# Patient Record
Sex: FEMALE | Race: WHITE | NOT HISPANIC OR LATINO | Employment: FULL TIME | URBAN - METROPOLITAN AREA
[De-identification: names, ages, dates, MRNs, and addresses within clinical notes are randomized per-mention and may not be internally consistent; named-entity substitution may affect disease eponyms.]

---

## 2021-05-30 ENCOUNTER — RECORDS - HEALTHEAST (OUTPATIENT)
Dept: ADMINISTRATIVE | Facility: CLINIC | Age: 51
End: 2021-05-30

## 2021-06-01 ENCOUNTER — RECORDS - HEALTHEAST (OUTPATIENT)
Dept: ADMINISTRATIVE | Facility: CLINIC | Age: 51
End: 2021-06-01

## 2023-04-27 ENCOUNTER — APPOINTMENT (OUTPATIENT)
Dept: RADIOLOGY | Facility: HOSPITAL | Age: 53
End: 2023-04-27
Attending: EMERGENCY MEDICINE
Payer: COMMERCIAL

## 2023-04-27 ENCOUNTER — HOSPITAL ENCOUNTER (EMERGENCY)
Facility: HOSPITAL | Age: 53
Discharge: HOME | End: 2023-04-27
Attending: EMERGENCY MEDICINE | Admitting: EMERGENCY MEDICINE
Payer: COMMERCIAL

## 2023-04-27 VITALS
TEMPERATURE: 97.7 F | OXYGEN SATURATION: 97 % | SYSTOLIC BLOOD PRESSURE: 131 MMHG | HEART RATE: 69 BPM | DIASTOLIC BLOOD PRESSURE: 80 MMHG | RESPIRATION RATE: 18 BRPM | BODY MASS INDEX: 32.72 KG/M2 | WEIGHT: 167.55 LBS

## 2023-04-27 DIAGNOSIS — R07.9 CHEST PAIN, UNSPECIFIED TYPE: ICD-10-CM

## 2023-04-27 LAB
ANION GAP SERPL CALCULATED.3IONS-SCNC: 10 MMOL/L (ref 7–15)
BASOPHILS # BLD AUTO: 0.1 10E3/UL (ref 0–0.2)
BASOPHILS NFR BLD AUTO: 1 %
BUN SERPL-MCNC: 11.9 MG/DL (ref 6–20)
CALCIUM SERPL-MCNC: 9.3 MG/DL (ref 8.6–10)
CHLORIDE SERPL-SCNC: 103 MMOL/L (ref 98–107)
CREAT SERPL-MCNC: 0.73 MG/DL (ref 0.51–0.95)
DEPRECATED HCO3 PLAS-SCNC: 26 MMOL/L (ref 22–29)
EOSINOPHIL # BLD AUTO: 0.2 10E3/UL (ref 0–0.7)
EOSINOPHIL NFR BLD AUTO: 2 %
ERYTHROCYTE [DISTWIDTH] IN BLOOD BY AUTOMATED COUNT: 13.2 % (ref 10–15)
GFR SERPL CREATININE-BSD FRML MDRD: >90 ML/MIN/1.73M2
GLUCOSE SERPL-MCNC: 103 MG/DL (ref 70–99)
HCT VFR BLD AUTO: 43.8 % (ref 35–47)
HGB BLD-MCNC: 14.4 G/DL (ref 11.7–15.7)
HOLD SPECIMEN: NORMAL
IMM GRANULOCYTES # BLD: 0 10E3/UL
IMM GRANULOCYTES NFR BLD: 0 %
LYMPHOCYTES # BLD AUTO: 1.4 10E3/UL (ref 0.8–5.3)
LYMPHOCYTES NFR BLD AUTO: 20 %
MCH RBC QN AUTO: 31.1 PG (ref 26.5–33)
MCHC RBC AUTO-ENTMCNC: 32.9 G/DL (ref 31.5–36.5)
MCV RBC AUTO: 95 FL (ref 78–100)
MONOCYTES # BLD AUTO: 0.5 10E3/UL (ref 0–1.3)
MONOCYTES NFR BLD AUTO: 7 %
NEUTROPHILS # BLD AUTO: 4.9 10E3/UL (ref 1.6–8.3)
NEUTROPHILS NFR BLD AUTO: 70 %
NRBC # BLD AUTO: 0 10E3/UL
NRBC BLD AUTO-RTO: 0 /100
PLATELET # BLD AUTO: 346 10E3/UL (ref 150–450)
POTASSIUM SERPL-SCNC: 3.8 MMOL/L (ref 3.4–5.3)
RBC # BLD AUTO: 4.63 10E6/UL (ref 3.8–5.2)
SODIUM SERPL-SCNC: 139 MMOL/L (ref 136–145)
TROPONIN T SERPL HS-MCNC: <6 NG/L
TROPONIN T SERPL HS-MCNC: <6 NG/L
WBC # BLD AUTO: 7 10E3/UL (ref 4–11)

## 2023-04-27 PROCEDURE — 80048 BASIC METABOLIC PNL TOTAL CA: CPT | Performed by: EMERGENCY MEDICINE

## 2023-04-27 PROCEDURE — 93005 ELECTROCARDIOGRAM TRACING: CPT | Performed by: STUDENT IN AN ORGANIZED HEALTH CARE EDUCATION/TRAINING PROGRAM

## 2023-04-27 PROCEDURE — 36415 COLL VENOUS BLD VENIPUNCTURE: CPT | Performed by: STUDENT IN AN ORGANIZED HEALTH CARE EDUCATION/TRAINING PROGRAM

## 2023-04-27 PROCEDURE — 36415 COLL VENOUS BLD VENIPUNCTURE: CPT | Performed by: EMERGENCY MEDICINE

## 2023-04-27 PROCEDURE — 84484 ASSAY OF TROPONIN QUANT: CPT | Mod: 91 | Performed by: EMERGENCY MEDICINE

## 2023-04-27 PROCEDURE — 250N000013 HC RX MED GY IP 250 OP 250 PS 637: Performed by: EMERGENCY MEDICINE

## 2023-04-27 PROCEDURE — 99285 EMERGENCY DEPT VISIT HI MDM: CPT | Mod: 25

## 2023-04-27 PROCEDURE — 84484 ASSAY OF TROPONIN QUANT: CPT | Performed by: EMERGENCY MEDICINE

## 2023-04-27 PROCEDURE — 71046 X-RAY EXAM CHEST 2 VIEWS: CPT

## 2023-04-27 PROCEDURE — 85025 COMPLETE CBC W/AUTO DIFF WBC: CPT | Performed by: EMERGENCY MEDICINE

## 2023-04-27 PROCEDURE — 93005 ELECTROCARDIOGRAM TRACING: CPT | Mod: 76 | Performed by: EMERGENCY MEDICINE

## 2023-04-27 RX ORDER — ASPIRIN 81 MG/1
243 TABLET, CHEWABLE ORAL ONCE
Status: COMPLETED | OUTPATIENT
Start: 2023-04-27 | End: 2023-04-27

## 2023-04-27 RX ORDER — NITROGLYCERIN 0.4 MG/1
0.4 TABLET SUBLINGUAL ONCE
Status: COMPLETED | OUTPATIENT
Start: 2023-04-27 | End: 2023-04-27

## 2023-04-27 RX ADMIN — NITROGLYCERIN 0.4 MG: 0.4 TABLET, ORALLY DISINTEGRATING SUBLINGUAL at 08:33

## 2023-04-27 RX ADMIN — ASPIRIN 243 MG: 81 TABLET, CHEWABLE ORAL at 08:33

## 2023-04-27 ASSESSMENT — ACTIVITIES OF DAILY LIVING (ADL)
ADLS_ACUITY_SCORE: 35
ADLS_ACUITY_SCORE: 35

## 2023-04-27 ASSESSMENT — ENCOUNTER SYMPTOMS
RHINORRHEA: 0
FEVER: 0
DIZZINESS: 0
COUGH: 0
NAUSEA: 0
NECK PAIN: 1
LIGHT-HEADEDNESS: 0

## 2023-04-27 NOTE — ED NOTES
[facility]  Expected date:   Expected time:   Means of arrival:   Comments:

## 2023-04-27 NOTE — DISCHARGE INSTRUCTIONS
Follow-up rapid access heart clinic.  Return to the ER for worsening symptoms such as return of chest pain.  Your EKG and blood tests and x-ray are reassuring but we cannot exclude heart disease therefore you need to follow-up with he rapid access heart clinic

## 2023-04-27 NOTE — ED NOTES
Pt states she no longer has cp.It hasn't been back since she took the nitroglycerin. She is feeling at baseline without pain.

## 2023-04-27 NOTE — ED PROVIDER NOTES
EMERGENCY DEPARTMENT ENCOUNTER      NAME: Izabel Bowling  AGE: 52 year old female  YOB: 1970  MRN: [id number]  EVALUATION DATE & TIME: 4/27/2023  8:01 AM    PCP: No primary care provider on file.    ED PROVIDER: Jose Elaine MD        Chief Complaint   Patient presents with     Chest Pain         FINAL IMPRESSION:  1. Chest pain, unspecified type          ED COURSE & MEDICAL DECISION MAKING:   Pertinent Labs & Imaging studies reviewed. (See chart for details)  52 year old female presents to the Emergency Department for evaluation of chest pain      ED Course as of 04/27/23 1421   Thu Apr 27, 2023   0859 52-year-old female who presents with left-sided chest tightness and left arm pain that occurred at work.  Does smoke.  Has a family history of heart disease.  Has not seen a doctor in probably a decade   0859 Differential includes ACS, pulmonary emboli, pneumonia, aortic dissection, esophageal rupture, pneumothorax, pneumonia, malignancy, pleurisy, shingles, and GERD.  Based on history and examination pulmonary emboli and aortic dissection unlikely.      0900 Will obtain EKG, troponin, BMP, CBC, give aspirin and nitroglycerin   0900 History and examination pulmonary emboli unlikely   0900 X-ray ordered   0900 Troponin T, High Sensitivity: <6   0900 Delta troponin ordered   0900 WBC: 7.0   0900 Hemoglobin: 14.4   0900 Platelet Count: 346   0900 Glucose(!): 103  Nonfasting glucose   1217 Heart score 3.   1420 Pain has resolved   1420 Notified by nursing the patient is getting antsy and she is considering leaving AGAINST MEDICAL ADVICE   1421 Updated patient on results of 2 EKGs and 2 troponins and labs and x-ray   1421 Refer to rapid access heart clinic     Discussed admission for observation expedited cardiac work-up patient wants to complete work-up as an outpatient.  Symptoms have resolved.  Patient will return to the ER for worsening symptoms.  Patient knows he cannot rule out heart  "disease.    8:18 AM I met with patient for initial interview and encounter. We also discussed plan for treatment and diagnostic interventions.   12:12 PM Updated patient on diagnostic findings. We also discussed plan for observation/admission, but she declined and will follow-up with [facility] heart clinic.     Medical Decision Making    History:    Supplemental history from: N/A    External Record(s) reviewed: Documented in chart, if applicable.    Work Up:    Chart documentation includes differential considered and any EKGs or imaging independently interpreted by provider, where specified.    In additional to work up documented, I considered the following work up: Documented in chart, if applicable.    External consultation:    Discussion of management with another provider: Documented in chart, if applicable    Complicating factors:    Care impacted by chronic illness: N/A    Care affected by social determinants of health: N/A    Disposition considerations: Discharge. No recommendations on prescription strength medication(s). I recommended admission, but the patient declined.    At the conclusion of the encounter I discussed the results of all of the tests and the disposition. The questions were answered. The patient or family acknowledged understanding and was agreeable with the care plan.       MEDICATIONS GIVEN IN THE EMERGENCY:  Medications   nitroGLYcerin (NITROSTAT) sublingual tablet 0.4 mg (0.4 mg Sublingual $Given 4/27/23 0833)   aspirin (ASA) chewable tablet 243 mg (243 mg Oral $Given 4/27/23 0833)       NEW PRESCRIPTIONS STARTED AT TODAY'S ER VISIT  There are no discharge medications for this patient.      =================================================================    HPI    Triage note  \"She started to have chest pain two hours ago. Denies any heart issues. She took one 81mg ASA prior to arrival. \"    Patient information was obtained from: Patient    Use of : N/A    Izabel Bowling is a " "52 year old female with no known pertinent history who presents to this ED by private car for evaluation of chest pain.     Patient reports a sudden onset of chest \"tightness\" that began while at work around 6:15 AM. She notes that the pain radiated into her left arm which prompted her visit into the ED. The pain at onset was rated at 5/10; however, at present, the pain has improved to a 3/10 after taking 1x 81 mg ASA. In addition, she complains of neck pain but is unsure if it is unchanged a she has chronic neck pain.     She otherwise denies any nausea, dizziness, lightheadedness, cough, runny nose, or fever.     Of note, patient has not seen a PCP in >10 years, but denies any known cardiac problems or history of similar symptoms. She is a smoker.     REVIEW OF SYSTEMS   Review of Systems   Constitutional: Negative for fever.   HENT: Negative for rhinorrhea.    Respiratory: Negative for cough.    Cardiovascular: Positive for chest pain (radiated to left arm, \"tightness,\" 5/10 onset, 3/10 now).   Gastrointestinal: Negative for nausea.   Musculoskeletal: Positive for neck pain (chronic).   Neurological: Negative for dizziness and light-headedness.        PAST MEDICAL HISTORY:  History reviewed. No pertinent past medical history.    PAST SURGICAL HISTORY:  History reviewed. No pertinent surgical history.    CURRENT MEDICATIONS:    No current outpatient medications on file.    ALLERGIES:  No Known Allergies    FAMILY HISTORY:  No family history on file.    SOCIAL HISTORY:   Social History     Socioeconomic History     Marital status:        VITALS:  /80   Pulse 69   Temp 97.7  F (36.5  C) (Temporal)   Resp 18   Wt 76 kg (167 lb 8.8 oz)   SpO2 97%   BMI 32.72 kg/m      PHYSICAL EXAM    Vitals: /80   Pulse 69   Temp 97.7  F (36.5  C) (Temporal)   Resp 18   Wt 76 kg (167 lb 8.8 oz)   SpO2 97%   BMI 32.72 kg/m    General: Appears in no acute distress, awake, alert, interactive.  Eyes: " Conjunctivae non-injected. Sclera anicteric.  HENT: Atraumatic.  Neck: Supple.  Respiratory/Chest: Respiration unlabored.  Heart: RRR, 2+ radial pulses.   Abdomen: non distended  Musculoskeletal: Normal extremities. No edema or erythema.  Skin: Normal color. No rash or diaphoresis.  Neurologic: Face symmetric, moves all extremities spontaneously. Speech clear.  Psychiatric: Oriented to person, place, and time. Affect appropriate.       LAB:  All pertinent labs reviewed and interpreted.  Results for orders placed or performed during the hospital encounter of 04/27/23   XR Chest 2 Views    Impression    IMPRESSION: Lungs are clear. No hydropneumothorax or rib fractures. Heart and pulmonary vascularity are normal.   Extra Blue Top Tube   Result Value Ref Range    Hold Specimen JIC    Extra Red Top Tube   Result Value Ref Range    Hold Specimen JIC    Extra Green Top (Lithium Heparin) Tube   Result Value Ref Range    Hold Specimen JIC    Extra Purple Top Tube   Result Value Ref Range    Hold Specimen JIC    Basic metabolic panel   Result Value Ref Range    Sodium 139 136 - 145 mmol/L    Potassium 3.8 3.4 - 5.3 mmol/L    Chloride 103 98 - 107 mmol/L    Carbon Dioxide (CO2) 26 22 - 29 mmol/L    Anion Gap 10 7 - 15 mmol/L    Urea Nitrogen 11.9 6.0 - 20.0 mg/dL    Creatinine 0.73 0.51 - 0.95 mg/dL    Calcium 9.3 8.6 - 10.0 mg/dL    Glucose 103 (H) 70 - 99 mg/dL    GFR Estimate >90 >60 mL/min/1.73m2   Result Value Ref Range    Troponin T, High Sensitivity <6 <=14 ng/L   CBC with platelets and differential   Result Value Ref Range    WBC Count 7.0 4.0 - 11.0 10e3/uL    RBC Count 4.63 3.80 - 5.20 10e6/uL    Hemoglobin 14.4 11.7 - 15.7 g/dL    Hematocrit 43.8 35.0 - 47.0 %    MCV 95 78 - 100 fL    MCH 31.1 26.5 - 33.0 pg    MCHC 32.9 31.5 - 36.5 g/dL    RDW 13.2 10.0 - 15.0 %    Platelet Count 346 150 - 450 10e3/uL    % Neutrophils 70 %    % Lymphocytes 20 %    % Monocytes 7 %    % Eosinophils 2 %    % Basophils 1 %    %  Immature Granulocytes 0 %    NRBCs per 100 WBC 0 <1 /100    Absolute Neutrophils 4.9 1.6 - 8.3 10e3/uL    Absolute Lymphocytes 1.4 0.8 - 5.3 10e3/uL    Absolute Monocytes 0.5 0.0 - 1.3 10e3/uL    Absolute Eosinophils 0.2 0.0 - 0.7 10e3/uL    Absolute Basophils 0.1 0.0 - 0.2 10e3/uL    Absolute Immature Granulocytes 0.0 <=0.4 10e3/uL    Absolute NRBCs 0.0 10e3/uL   Result Value Ref Range    Troponin T, High Sensitivity <6 <=14 ng/L       RADIOLOGY:  Reviewed all pertinent imaging. Please see official radiology report.  XR Chest 2 Views   Final Result   IMPRESSION: Lungs are clear. No hydropneumothorax or rib fractures. Heart and pulmonary vascularity are normal.          EKG:    Performed at: 08:04    Impression: Normal sinus rhythm. Normal ECG.    Rate: 91 bpm  Rhythm: NSR  Axis: 35  AZ Interval: 134 ms  QRS Interval: 72 ms  QTc Interval: 425 ms  ST Changes: None  Comparison: No previous ECG's available for comparison.     Performed at: 10:18    Impression: Normal sinus rhythm. Normal ECG.    Rate: 72 bpm  Rhythm: NSR  Axis: 47  AZ Interval: 142 ms  QRS Interval: 72 ms  QTc Interval: 413 ms  ST Changes: None  Comparison: When compared to ECG completed from earlier today, there were no significant changes noted.     I have independently reviewed and interpreted the EKG(s) documented above.      I, Toi Kevin, am serving as a scribe to document services personally performed by Brian Elaine MD based on my observation and the provider's statements to me. I, Dr. Brian Elaine, attest that Toi Kevin is acting in a scribe capacity, has observed my performance of the services and has documented them in accordance with my direction.    Brian Elaine MD  Emergency Medicine  [facility] EMERGENCY DEPARTMENT  [address] [facility] [id number] [phone]     Brian Elaine MD  04/27/23 6768

## 2023-04-27 NOTE — ED TRIAGE NOTES
She started to have chest pain two hours ago. Denies any heart issues. She took one 81mg ASA prior to arrival.

## 2023-04-28 LAB
ATRIAL RATE - MUSE: 72 BPM
ATRIAL RATE - MUSE: 91 BPM
DIASTOLIC BLOOD PRESSURE - MUSE: NORMAL MMHG
DIASTOLIC BLOOD PRESSURE - MUSE: NORMAL MMHG
INTERPRETATION ECG - MUSE: NORMAL
INTERPRETATION ECG - MUSE: NORMAL
P AXIS - MUSE: 51 DEGREES
P AXIS - MUSE: 53 DEGREES
PR INTERVAL - MUSE: 134 MS
PR INTERVAL - MUSE: 142 MS
QRS DURATION - MUSE: 72 MS
QRS DURATION - MUSE: 72 MS
QT - MUSE: 346 MS
QT - MUSE: 378 MS
QTC - MUSE: 413 MS
QTC - MUSE: 425 MS
R AXIS - MUSE: 35 DEGREES
R AXIS - MUSE: 47 DEGREES
SYSTOLIC BLOOD PRESSURE - MUSE: NORMAL MMHG
SYSTOLIC BLOOD PRESSURE - MUSE: NORMAL MMHG
T AXIS - MUSE: 30 DEGREES
T AXIS - MUSE: 31 DEGREES
VENTRICULAR RATE- MUSE: 72 BPM
VENTRICULAR RATE- MUSE: 91 BPM